# Patient Record
Sex: MALE | Race: WHITE
[De-identification: names, ages, dates, MRNs, and addresses within clinical notes are randomized per-mention and may not be internally consistent; named-entity substitution may affect disease eponyms.]

---

## 2018-06-15 ENCOUNTER — HOSPITAL ENCOUNTER (EMERGENCY)
Dept: HOSPITAL 17 - PHEFT | Age: 23
Discharge: HOME | End: 2018-06-15
Payer: COMMERCIAL

## 2018-06-15 VITALS
SYSTOLIC BLOOD PRESSURE: 126 MMHG | HEART RATE: 78 BPM | TEMPERATURE: 98.6 F | RESPIRATION RATE: 18 BRPM | OXYGEN SATURATION: 97 % | DIASTOLIC BLOOD PRESSURE: 71 MMHG

## 2018-06-15 VITALS — WEIGHT: 177.91 LBS | BODY MASS INDEX: 24.1 KG/M2 | HEIGHT: 72 IN
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
## 2018-06-15 DIAGNOSIS — Z23: ICD-10-CM

## 2018-06-15 DIAGNOSIS — R51: ICD-10-CM

## 2018-06-15 DIAGNOSIS — S01.81XA: Primary | ICD-10-CM

## 2018-06-15 PROCEDURE — 12011 RPR F/E/E/N/L/M 2.5 CM/<: CPT
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
